# Patient Record
Sex: MALE | Race: OTHER | HISPANIC OR LATINO | URBAN - METROPOLITAN AREA
[De-identification: names, ages, dates, MRNs, and addresses within clinical notes are randomized per-mention and may not be internally consistent; named-entity substitution may affect disease eponyms.]

---

## 2021-08-10 ENCOUNTER — EMERGENCY (EMERGENCY)
Facility: HOSPITAL | Age: 31
LOS: 1 days | Discharge: HOME | End: 2021-08-10
Attending: EMERGENCY MEDICINE
Payer: MEDICARE

## 2021-08-10 VITALS
HEART RATE: 127 BPM | HEIGHT: 69 IN | SYSTOLIC BLOOD PRESSURE: 94 MMHG | OXYGEN SATURATION: 96 % | DIASTOLIC BLOOD PRESSURE: 58 MMHG | WEIGHT: 188.72 LBS | TEMPERATURE: 103 F | RESPIRATION RATE: 24 BRPM

## 2021-08-10 PROCEDURE — 99284 EMERGENCY DEPT VISIT MOD MDM: CPT

## 2021-08-10 PROCEDURE — 99053 MED SERV 10PM-8AM 24 HR FAC: CPT

## 2021-08-10 NOTE — ED ADULT TRIAGE NOTE - CHIEF COMPLAINT QUOTE
Patient ambulated into ED with steady gait, Pt c/o fever, body aches, headache, nausea and vomiting x2 days took Motrin @ 6pm.

## 2021-08-11 VITALS
SYSTOLIC BLOOD PRESSURE: 105 MMHG | OXYGEN SATURATION: 97 % | RESPIRATION RATE: 18 BRPM | DIASTOLIC BLOOD PRESSURE: 74 MMHG | TEMPERATURE: 101 F | HEART RATE: 97 BPM

## 2021-08-11 LAB
ALBUMIN SERPL ELPH-MCNC: 4.2 G/DL — SIGNIFICANT CHANGE UP (ref 3.3–5.2)
ALP SERPL-CCNC: 79 U/L — SIGNIFICANT CHANGE UP (ref 40–120)
ALT FLD-CCNC: 30 U/L — SIGNIFICANT CHANGE UP
ANION GAP SERPL CALC-SCNC: 12 MMOL/L — SIGNIFICANT CHANGE UP (ref 5–17)
AST SERPL-CCNC: 22 U/L — SIGNIFICANT CHANGE UP
BASOPHILS # BLD AUTO: 0.02 K/UL — SIGNIFICANT CHANGE UP (ref 0–0.2)
BASOPHILS NFR BLD AUTO: 0.2 % — SIGNIFICANT CHANGE UP (ref 0–2)
BILIRUB SERPL-MCNC: 0.9 MG/DL — SIGNIFICANT CHANGE UP (ref 0.4–2)
BUN SERPL-MCNC: 25 MG/DL — HIGH (ref 8–20)
CALCIUM SERPL-MCNC: 9 MG/DL — SIGNIFICANT CHANGE UP (ref 8.6–10.2)
CHLORIDE SERPL-SCNC: 102 MMOL/L — SIGNIFICANT CHANGE UP (ref 98–107)
CO2 SERPL-SCNC: 25 MMOL/L — SIGNIFICANT CHANGE UP (ref 22–29)
CREAT SERPL-MCNC: 0.96 MG/DL — SIGNIFICANT CHANGE UP (ref 0.5–1.3)
EOSINOPHIL # BLD AUTO: 0.01 K/UL — SIGNIFICANT CHANGE UP (ref 0–0.5)
EOSINOPHIL NFR BLD AUTO: 0.1 % — SIGNIFICANT CHANGE UP (ref 0–6)
GLUCOSE SERPL-MCNC: 117 MG/DL — HIGH (ref 70–99)
HCT VFR BLD CALC: 46.5 % — SIGNIFICANT CHANGE UP (ref 39–50)
HGB BLD-MCNC: 15.8 G/DL — SIGNIFICANT CHANGE UP (ref 13–17)
IMM GRANULOCYTES NFR BLD AUTO: 0.4 % — SIGNIFICANT CHANGE UP (ref 0–1.5)
LYMPHOCYTES # BLD AUTO: 0.73 K/UL — LOW (ref 1–3.3)
LYMPHOCYTES # BLD AUTO: 6.7 % — LOW (ref 13–44)
MCHC RBC-ENTMCNC: 29.6 PG — SIGNIFICANT CHANGE UP (ref 27–34)
MCHC RBC-ENTMCNC: 34 GM/DL — SIGNIFICANT CHANGE UP (ref 32–36)
MCV RBC AUTO: 87.1 FL — SIGNIFICANT CHANGE UP (ref 80–100)
MONOCYTES # BLD AUTO: 0.82 K/UL — SIGNIFICANT CHANGE UP (ref 0–0.9)
MONOCYTES NFR BLD AUTO: 7.5 % — SIGNIFICANT CHANGE UP (ref 2–14)
NEUTROPHILS # BLD AUTO: 9.33 K/UL — HIGH (ref 1.8–7.4)
NEUTROPHILS NFR BLD AUTO: 85.1 % — HIGH (ref 43–77)
PLATELET # BLD AUTO: 204 K/UL — SIGNIFICANT CHANGE UP (ref 150–400)
POTASSIUM SERPL-MCNC: 3.8 MMOL/L — SIGNIFICANT CHANGE UP (ref 3.5–5.3)
POTASSIUM SERPL-SCNC: 3.8 MMOL/L — SIGNIFICANT CHANGE UP (ref 3.5–5.3)
PROT SERPL-MCNC: 7.1 G/DL — SIGNIFICANT CHANGE UP (ref 6.6–8.7)
RBC # BLD: 5.34 M/UL — SIGNIFICANT CHANGE UP (ref 4.2–5.8)
RBC # FLD: 12.1 % — SIGNIFICANT CHANGE UP (ref 10.3–14.5)
SARS-COV-2 RNA SPEC QL NAA+PROBE: SIGNIFICANT CHANGE UP
SODIUM SERPL-SCNC: 139 MMOL/L — SIGNIFICANT CHANGE UP (ref 135–145)
WBC # BLD: 10.95 K/UL — HIGH (ref 3.8–10.5)
WBC # FLD AUTO: 10.95 K/UL — HIGH (ref 3.8–10.5)

## 2021-08-11 PROCEDURE — 85025 COMPLETE CBC W/AUTO DIFF WBC: CPT

## 2021-08-11 PROCEDURE — U0003: CPT

## 2021-08-11 PROCEDURE — 96375 TX/PRO/DX INJ NEW DRUG ADDON: CPT

## 2021-08-11 PROCEDURE — 99284 EMERGENCY DEPT VISIT MOD MDM: CPT | Mod: 25

## 2021-08-11 PROCEDURE — U0005: CPT

## 2021-08-11 PROCEDURE — 36415 COLL VENOUS BLD VENIPUNCTURE: CPT

## 2021-08-11 PROCEDURE — 80053 COMPREHEN METABOLIC PANEL: CPT

## 2021-08-11 PROCEDURE — 96374 THER/PROPH/DIAG INJ IV PUSH: CPT

## 2021-08-11 RX ORDER — SODIUM CHLORIDE 9 MG/ML
1000 INJECTION INTRAMUSCULAR; INTRAVENOUS; SUBCUTANEOUS ONCE
Refills: 0 | Status: COMPLETED | OUTPATIENT
Start: 2021-08-11 | End: 2021-08-11

## 2021-08-11 RX ORDER — IBUPROFEN 200 MG
1 TABLET ORAL
Qty: 28 | Refills: 0
Start: 2021-08-11 | End: 2021-08-17

## 2021-08-11 RX ORDER — ONDANSETRON 8 MG/1
4 TABLET, FILM COATED ORAL ONCE
Refills: 0 | Status: COMPLETED | OUTPATIENT
Start: 2021-08-11 | End: 2021-08-11

## 2021-08-11 RX ORDER — ONDANSETRON 8 MG/1
1 TABLET, FILM COATED ORAL
Qty: 9 | Refills: 0
Start: 2021-08-11 | End: 2021-08-13

## 2021-08-11 RX ORDER — KETOROLAC TROMETHAMINE 30 MG/ML
30 SYRINGE (ML) INJECTION ONCE
Refills: 0 | Status: DISCONTINUED | OUTPATIENT
Start: 2021-08-11 | End: 2021-08-11

## 2021-08-11 RX ADMIN — Medication 30 MILLIGRAM(S): at 01:15

## 2021-08-11 RX ADMIN — ONDANSETRON 4 MILLIGRAM(S): 8 TABLET, FILM COATED ORAL at 01:15

## 2021-08-11 RX ADMIN — SODIUM CHLORIDE 1000 MILLILITER(S): 9 INJECTION INTRAMUSCULAR; INTRAVENOUS; SUBCUTANEOUS at 01:15

## 2021-08-11 NOTE — ED PROVIDER NOTE - PHYSICAL EXAMINATION
General: In NAD, non-toxic; well nourished/developed.  Skin: No rashes or lesions. Warm, dry, color normal for race.   Head: Normocephalic/atraumatic.   Eyes: Sclera anicteric, conjunctivae clear b/l. PERRLA, EOMI.   Neck: Supple, FROM. No nuchal rigidity.   Cardio: Tachycardic, rhythm regular. No audible murmur, gallop, or rub.  PV: Pulses: b/l 2+ radial, DP, PT. Capillary refill <2 seconds.  Resp: Normal AP to lateral diameter, symmetrical excursion b/l. Breath sounds vesicular, symmetrical and without rales, rhonchi or wheezing b/l.  Abd: Non-distended. Soft, non-tender, no masses palpated. No rebound, guarding. No CVA tenderness.  MSK: MAEx4. FROM.   Neuro: A&Ox3. No motor/sensory deficits.

## 2021-08-11 NOTE — ED PROVIDER NOTE - CLINICAL SUMMARY MEDICAL DECISION MAKING FREE TEXT BOX
30 yo male no PMHx presents to ED c/o fever, vomiting, body aches, headaches. +Sick contact with son who is also vomiting. Labs unremarkable, VS improved. Advised on symptomatic relief. Return precautions.

## 2021-08-11 NOTE — ED PROVIDER NOTE - PATIENT PORTAL LINK FT
You can access the FollowMyHealth Patient Portal offered by [facility] by registering at the following website: [email or web address]. By joining [employer]’s FollowMyHealth portal, you will also be able to view your health information using other applications (apps) compatible with our system.

## 2021-08-11 NOTE — ED PROVIDER NOTE - NSFOLLOWUPINSTRUCTIONS_ED_ALL_ED_FT
- Prescription sent to pharmacy.  - Ibuprofen 600mg every 6 hours, acetaminophen 650mg every 6 hours for fever.  - Wilcox diet as tolerated.    - Please bring all documentation from your ED visit to any related future follow up appointment.  - Please call to schedule follow up appointment with your primary care physician within 24-48 hours.  - Please seek immediate medical attention for any new/worsening, signs/symptoms, or concerns.    Feel better!     - Receta enviada a farmacia.  - Ibuprofeno 600 mg cada 6 horas, acetaminofeno 650 mg cada 6 horas para la fiebre.  - Dieta blanda según la tolerancia.  - Lleve toda la documentación de chaudhry visita a urgencias a cualquier ronak de seguimiento futura relacionada.  - Llame para programar joesph ronak de seguimiento con chaudhry médico de atención primaria dentro de las 24 a 48 horas.  - Busque atención médica inmediata ante cualquier nuevo / empeoramiento, signo / síntoma o inquietud.    ¡Sentirse mejor!    Náuseas y vómitos agudos    LO QUE NECESITA SABER:    Las náuseas y los vómitos agudos comienzan de forma repentina, empeoran rápidamente y roger un período breve de tiempo.    INSTRUCCIONES SOBRE EL [facility]ITAL[facility]:    Busque atención médica de inmediato si:  •Usted nota nanette en chaudhry vómito o en delfina evacuaciones.      •Usted siente un dolor súbito e intenso en el pecho y la parte superior de chaudhry abdomen después de tener vómitos fantasma o tratar de vomitar.      •Usted tiene el rolan y el pecho inflamados.      •Usted está mareado, tiene frío, sed y sequedad en los ojos y la boca.      •Usted está orinando muy poco o nada en absoluto.      •Usted tiene debilidad muscular, calambres en las piernas y dificultad para respirar.      •Chaudhry corazón late más rápido que de costumbre.      •Usted continúa vomitando por más de 48 horas.      Comuníquese con chaudhry médico si:  •Usted tiene arcadas secas (vómitos sin que salga nada) frecuentes.      •Delfina náusea y vómitos no mejoran ni desaparecen después de usar el medicamento.      •Usted tiene preguntas o inquietudes acerca de chaudhry condición o tratamiento.      Medicamentos:Es posible que usted necesite alguno de los siguientes:   •Los medicamentosse puede administrar para calmarle el estómago y detener delfina vómitos. Usted también puede necesitar medicamentos para ayudarlo a sentirse más relajado o para detener las náuseas y los vómitos causados por el mareo por movimiento.      •Se usan los estimulantes gastrointestinalespara ayudar a vaciar chaudhry estómago y los intestinos. [place] puede ayudar para reducir las náuseas y el vómito.      •[place] delfina medicamentos ana lilia se le haya indicado.Consulte con chaudhry médico si usted agustina que chaudhry medicamento no le está ayudando o si presenta efectos secundarios. Infórmele si es alérgico a cualquier medicamento. Mantenga joesph lista actualizada de los medicamentos, las vitaminas y los productos herbales que fredy. Incluya los siguientes datos de los medicamentos: cantidad, frecuencia y motivo de administración. Traiga con usted la lista o los envases de las píldoras a delfina citas de seguimiento. Lleve la lista de los medicamentos con usted en rober de joesph emergencia.      Evite o controle las náuseas y los vómitos agudos:  •No consuma alcohol.El alcohol podría causarle malestar o irritación estomacal. Joesph cantidad elevada de alcohol también puede causar náuseas y vómitos agudos.      •Controle el estrés.Los yudelka de nga que son el resultado de tensión nerviosa pueden causar náuseas y vómitos. Busque la manera de relajarse y controlar el estrés. Descanse y duerma más.      •[place] más líquidos ana lilia se le indique.Los vómitos pueden llevar a la deshidratación. Es importante beber más líquidos para ayudar a reemplazar los fluidos corporales perdidos. Pregunte a chaudhry médico sobre la cantidad de líquido que necesita bam todos los elisa y cuáles le recomienda. Chaudhry médico podría recomendarle que tome joesph solución de rehidratación oral (SRO). Las soluciones de rehidratación oral contienen la cantidad adecuada de agua, sales y azúcar que necesita para restituir los líquidos que perdió chaudhry organismo. Pregunte qué tipo de solución de rehidratación oral debe usar, qué cantidad debe bam y dónde puede obtenerla.      •Ingiera comidas más pequeñas, más a menudo.Coma pequeñas cantidades de comida cada 2 o 3 horas, incluso si no tiene hambre. Delfina náuseas podrían disminuir si tiene comida en el estómago.      •Hable con chaudhry médico antes de bam medicamentos de venta farhat.Estos medicamentos pueden causar problemas serios si los usa junto con ciertos medicamentos o si tiene determinadas condiciones médicas. Podrían tener problemas si usa joesph dosis demasiado chilo o los usa elian más tiempo de lo indicado. Siga las indicaciones de la etiqueta al pie de la letra.      Acuda a delfina consultas de control con chaudhry médico según le indicaron.Anote delfina preguntas para que se acuerde de hacerlas elian delfina visitas.

## 2021-08-11 NOTE — ED PROVIDER NOTE - OBJECTIVE STATEMENT
30 yo male no PMHx presents to ED c/o fever and vomiting x2 days. Subjective fevers, chills, headache, body aches, soft stools. Self medicating with ibuprofen, last dose 10 hours ago, acetaminophen last dose 6 hours ago. Unvaccinated. No further complaints at this time.   Denies sick contacts, nasal congestion, chest pain, SOB, abdominal pain, urinary sxms, diarrhea. 30 yo male no PMHx presents to ED c/o fever and vomiting x2 days. Subjective fevers, chills, headache, body aches, soft stools. Self medicating with ibuprofen, last dose 10 hours ago, acetaminophen last dose 6 hours ago. Unvaccinated. +Sick contact with son who is also vomiting. No further complaints at this time.   Denies nasal congestion, chest pain, SOB, abdominal pain, urinary sxms, diarrhea. 32 yo male no PMHx presents to ED c/o fever and vomiting x2 days. Subjective fevers, chills, headache, body aches, soft stools. Self medicating with ibuprofen, last dose 10 hours ago, acetaminophen last dose 6 hours ago. Unvaccinated for covid. +Sick contact with son who is also vomiting. No further complaints at this time.   Denies nasal congestion, chest pain, SOB, abdominal pain, urinary sxms, diarrhea.

## 2022-05-12 ENCOUNTER — EMERGENCY (EMERGENCY)
Facility: HOSPITAL | Age: 32
LOS: 1 days | Discharge: HOME | End: 2022-05-12
Attending: EMERGENCY MEDICINE
Payer: COMMERCIAL

## 2022-05-12 VITALS
WEIGHT: 179.9 LBS | TEMPERATURE: 101 F | OXYGEN SATURATION: 100 % | RESPIRATION RATE: 20 BRPM | DIASTOLIC BLOOD PRESSURE: 68 MMHG | HEART RATE: 86 BPM | SYSTOLIC BLOOD PRESSURE: 122 MMHG | HEIGHT: 69 IN

## 2022-05-12 PROCEDURE — 0225U NFCT DS DNA&RNA 21 SARSCOV2: CPT

## 2022-05-12 PROCEDURE — 99284 EMERGENCY DEPT VISIT MOD MDM: CPT

## 2022-05-12 PROCEDURE — 71046 X-RAY EXAM CHEST 2 VIEWS: CPT | Mod: 26

## 2022-05-12 PROCEDURE — 71046 X-RAY EXAM CHEST 2 VIEWS: CPT

## 2022-05-12 PROCEDURE — 99285 EMERGENCY DEPT VISIT HI MDM: CPT

## 2022-05-12 RX ORDER — ACETAMINOPHEN 500 MG
975 TABLET ORAL ONCE
Refills: 0 | Status: COMPLETED | OUTPATIENT
Start: 2022-05-12 | End: 2022-05-12

## 2022-05-12 RX ORDER — IBUPROFEN 200 MG
600 TABLET ORAL ONCE
Refills: 0 | Status: COMPLETED | OUTPATIENT
Start: 2022-05-12 | End: 2022-05-12

## 2022-05-12 RX ADMIN — Medication 600 MILLIGRAM(S): at 23:52

## 2022-05-12 RX ADMIN — Medication 975 MILLIGRAM(S): at 23:51

## 2022-05-12 NOTE — ED PROVIDER NOTE - OBJECTIVE STATEMENT
32yom no PMH multiple sick contacts in the family with influenza, complains of 2 weeks of fever, body aches and cough. No interventions prior to arrival.

## 2022-05-12 NOTE — ED PROVIDER NOTE - CLINICAL SUMMARY MEDICAL DECISION MAKING FREE TEXT BOX
Suspect influenza or other viral infection, clear lungs, comfortable work of breathing, no hypoxia requiring intervention. Instructed on supportive care and antipyretic use. RVP sent for follow up.

## 2022-05-12 NOTE — ED PROVIDER NOTE - PATIENT PORTAL LINK FT
You can access the FollowMyHealth Patient Portal offered by [facility] by registering at the following website: [email or web address]. By joining [employer]’s FollowMyHealth portal, you will also be able to view your health information using other applications (apps) compatible with our system.

## 2022-05-12 NOTE — ED PROVIDER NOTE - NSFOLLOWUPINSTRUCTIONS_ED_ALL_ED_FT
Take ibuprofen 600mg every 6 hours and/or acetaminophen 1000mg every 6 hours as needed for aches, pains or fever.  A COVID/Influenza test was sent and you be called with results  You may take over-the-counter cough medicines as directed on packaging  Return to the ER with any new, worsening or persistent symptoms.

## 2022-05-13 PROBLEM — Z78.9 OTHER SPECIFIED HEALTH STATUS: Chronic | Status: ACTIVE | Noted: 2021-08-11

## 2022-05-13 LAB
RAPID RVP RESULT: SIGNIFICANT CHANGE UP
SARS-COV-2 RNA SPEC QL NAA+PROBE: SIGNIFICANT CHANGE UP

## 2023-03-17 ENCOUNTER — EMERGENCY (EMERGENCY)
Facility: HOSPITAL | Age: 33
LOS: 1 days | Discharge: HOME | End: 2023-03-17
Attending: EMERGENCY MEDICINE
Payer: COMMERCIAL

## 2023-03-17 VITALS
TEMPERATURE: 98 F | DIASTOLIC BLOOD PRESSURE: 96 MMHG | OXYGEN SATURATION: 100 % | WEIGHT: 187.39 LBS | SYSTOLIC BLOOD PRESSURE: 152 MMHG | RESPIRATION RATE: 18 BRPM | HEART RATE: 60 BPM | HEIGHT: 68 IN

## 2023-03-17 PROCEDURE — 99283 EMERGENCY DEPT VISIT LOW MDM: CPT

## 2023-03-17 NOTE — ED PROVIDER NOTE - PATIENT PORTAL LINK FT
You can access the FollowMyHealth Patient Portal offered by [facility] by registering at the following website: [email or web address]. By joining [employer]’s FollowMyHealth portal, you will also be able to view your health information using other applications (apps) compatible with our system.

## 2023-03-17 NOTE — ED PROVIDER NOTE - PHYSICAL EXAMINATION
Gen: No acute distress, non toxic  CV: RRR, nl s1/s2.  Resp: CTAB, normal rate and effort  Skin: tick bite noted on R wrist with mild surrounding erythema. intact and perfused.

## 2023-03-17 NOTE — ED PROVIDER NOTE - CLINICAL SUMMARY MEDICAL DECISION MAKING FREE TEXT BOX
33yo M p/w insect bite on R wrist. works as . pt does not believe tick has been attached for more than 48h. pt brought tick to ED. all limbs of tick attached. tick insertion bite noted on R wrist with small scab, mild surrounding erythema, no edema or skin findings concerning for infection. gave doxy 200mg. discussed supportive care measures, preventative measures and return precautions. advised to f/u with PCP. pt verbalized understanding and agreement.

## 2023-03-17 NOTE — ED PROVIDER NOTE - OBJECTIVE STATEMENT
33yo M presents to ED c/o tick bite on R wrist. 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 pt does not believe tick has been attached for more than 48h. pt brought tick to ED. all limbs of tick attached. pt believes part of leg remained inside skin. pt denies fever, NV, CP, sob, rash, pruritis, local pain surrounding bite.

## 2023-03-17 NOTE — ED ADULT TRIAGE NOTE - CHIEF COMPLAINT QUOTE
s/p insect bite to the right lower arm that happened at work, small lump that is red, painful and  raised. Pt have insect in bag with him

## 2023-03-17 NOTE — ED PROVIDER NOTE - ATTENDING APP SHARED VISIT CONTRIBUTION OF CARE
s/p tick removal; non engorged; small punctate wound to upper ext at site of recent tick; no erythema or other signs of infection or skin breakdown; emperic doxy; otherwise reassurance provided and safe for discharge.    This was a shared visit with LAURI. I reviewed and verified the documentation and independently performed the documented history/exam/mdm.

## 2023-03-18 PROCEDURE — T1013: CPT

## 2023-03-18 PROCEDURE — 99283 EMERGENCY DEPT VISIT LOW MDM: CPT

## 2023-03-18 RX ADMIN — Medication 200 MILLIGRAM(S): at 00:45

## 2025-02-05 ENCOUNTER — APPOINTMENT (OUTPATIENT)
Dept: FAMILY MEDICINE | Facility: CLINIC | Age: 35
End: 2025-02-05
Payer: SUBSIDIZED

## 2025-02-05 VITALS
HEART RATE: 80 BPM | RESPIRATION RATE: 18 BRPM | OXYGEN SATURATION: 98 % | DIASTOLIC BLOOD PRESSURE: 77 MMHG | HEIGHT: 69 IN | WEIGHT: 200 LBS | TEMPERATURE: 98 F | SYSTOLIC BLOOD PRESSURE: 122 MMHG | BODY MASS INDEX: 29.62 KG/M2

## 2025-02-05 DIAGNOSIS — Z00.00 ENCOUNTER FOR GENERAL ADULT MEDICAL EXAMINATION W/OUT ABNORMAL FINDINGS: ICD-10-CM

## 2025-02-05 DIAGNOSIS — M54.9 DORSALGIA, UNSPECIFIED: ICD-10-CM

## 2025-02-05 DIAGNOSIS — M79.10 MYALGIA, UNSPECIFIED SITE: ICD-10-CM

## 2025-02-05 DIAGNOSIS — K92.1 MELENA: ICD-10-CM

## 2025-02-05 PROCEDURE — 99385 PREV VISIT NEW AGE 18-39: CPT

## 2025-02-05 RX ORDER — METHOCARBAMOL 750 MG/1
750 TABLET, FILM COATED ORAL
Qty: 14 | Refills: 1 | Status: ACTIVE | COMMUNITY
Start: 2025-02-05 | End: 1900-01-01

## 2025-02-05 RX ORDER — DOCUSATE SODIUM 100 MG/1
100 CAPSULE ORAL TWICE DAILY
Qty: 28 | Refills: 0 | Status: ACTIVE | COMMUNITY
Start: 2025-02-05 | End: 1900-01-01

## 2025-02-19 NOTE — ED ADULT NURSE NOTE - NS ED NOTE ABUSE SUSPICION NEGLECT YN
No

## 2025-02-27 DIAGNOSIS — E78.1 PURE HYPERGLYCERIDEMIA: ICD-10-CM

## 2025-02-27 DIAGNOSIS — E55.9 VITAMIN D DEFICIENCY, UNSPECIFIED: ICD-10-CM

## 2025-02-27 RX ORDER — ERGOCALCIFEROL 1.25 MG/1
1.25 MG CAPSULE ORAL
Qty: 13 | Refills: 1 | Status: ACTIVE | COMMUNITY
Start: 2025-02-27 | End: 1900-01-01